# Patient Record
Sex: FEMALE | Race: WHITE
[De-identification: names, ages, dates, MRNs, and addresses within clinical notes are randomized per-mention and may not be internally consistent; named-entity substitution may affect disease eponyms.]

---

## 2020-10-21 NOTE — NUR
MS/RN OPENING NOTE 



Patient awake in bed, A/O x3, bedrest. Breathing even, clear, unlabored on room air. No JVD. 
Tongue midline, no tracheal deviation. CRP <3seconds. Skin is warm, pink, dry, appropriate 
for ethnicity. Discoloration noted on bilateral upper and lower extremities. Edema noted in 
bilateral upper extremities. IV site right wrist 22g saline locked, patent and intact. No 
redness or infiltration. Abdomen large, round, soft, non-tender. BS hypoactive. Patient void 
via bedpan. Urine output, clear, yellow, without difficulty. Bed in low position, wheels 
locked, side rails up x2, call light within reach.

## 2020-10-21 NOTE — NUR
BIBRA39 FRM [facility]. L ANKLE PAIN S/G GLF GOING TO BATHROOM. 100MCG FENTANYL  
GIVEN PTA. ; PT ALERT/AWAKE, PT TO BED 1, -SOB/NAD. PLACED ON MONITOR. RR EVEN 
UNLABORED, NOTED WITH AIR SPLINT ON LLE, VSS. PENDING ER PROVIDER NADYA

## 2020-10-21 NOTE — NUR
RN NOTES



PATIENT RESTING IN BED COMFORTABLY IN MODERATE HIGH BACK REST. A/O X3. NO SIGNS OF DISTRESS 
NOTED. IV ACCESS ON RIGHT WRIST #22, SL. SAFETY MEASURES IN PLACE, BED IN LOWEST LOCKED 
POSITION WITH SIDE RAILS UP X2. CALL LIGHT WITHIN EASY REACH. WILL ENDORSE TO NIGHT SHIFT 
NURSE FOR FAWN.

## 2020-10-21 NOTE — NUR
RN NOTES



REPORT RECEIVED FROM DEBORAH(ALEX RN) AT BED SIDE, ADMITTED 81 Y/O FEMALE, A/O X4, ABLE TO MAKE 
NEEDS KNOWN. PATIENT ORIENTED TO ROOM, UNIT AND STAFF. ON ROOM AIR SATING @97%. V/S STABLE 
AND RECORDED. REFUSED SKIN ASSESSMENT AND REFUSED TO BE MOVED DUE TO PAIN. WILL ADMINISTER 
PAIN MEDICATION.  IV ACCESS ON RIGHT WRIST #22, PATENT AND INTACT. CALLED LAB AND THEY SAID 
SPECIMEN FROM COVID TEST(PCR) IS ALREADY THERE. SAFETY MEASURES INITIATED, BED PLACE IN 
LOWEST LOCKED POSITION WITH SIDE RAILS UP X2. CALL LIGHT PLACED WITHIN REACH. WILL CONTINUE 
TO MONITOR.

## 2020-10-22 NOTE — NUR
MS/RN CLOSING NOTE 



Patient awake in bed, A/O x3, bedrest. Breathing even, clear, unlabored on room air. 
Discoloration noted on bilateral upper and lower extremities. Edema noted in bilateral upper 
extremities. IV site right wrist 22g saline locked, patent and intact. No redness or 
infiltration. Abdomen large, round, soft, non-tender. Patient void via bedpan 1x. Urine 
output, clear, yellow, without difficulty. All scheduled medications administered as 
ordered. Bed in low position, wheels locked, side rails up x2, call light within reach.

## 2020-10-22 NOTE — NUR
MS/RN NOTE 



Patient potassium level 3.4. MD notified. Received new med order for potassium 20 mEq PO 
once. Will continue to monitor.

## 2020-10-22 NOTE — NUR
MS RN NOTES

RECEIVED ON BED A/O X3,ON HIGH FOWLERS POSITION,BREATHING EASY,NO SOB,BARNES IN PLACE 
DRAINING YELLOWISH OUTPUT.IVF NS 125ML/HR RATE IN PROGRESS VIA IV PUMP,SITE PATENT ON RIGHT 
ARM.C/O PAIN ON LEFT ANKLE,S/P FALL AND SUSTAINED FRACTURE ON LEFT ANKLE,AWAITING ORTHO 
CONSULT.ISOLATION PRECAUTION, AWAITING COVID TEST RESULT DONE 10/21.WILL CONTINUE TO  
MONITOR STATUS.

## 2020-10-22 NOTE — NUR
ms rn

received on bed, awake,alert,oriented x3, s/p fall w/ fractured left tibia,dressing intact 
w/ immobilizer on, denies pain at this time, will monitor patient.

## 2020-10-22 NOTE — NUR
ms rn

on bed,no distress noted, still no ortho came to evaluate patient, dr. banda aware and 
texted, awaiting for reply.

## 2020-10-23 NOTE — NUR
BLOOD VERIFIED BY TWO NURSES. TRANSFUSION STARTED AT THIS TIME. PRE TRANSFUSION VS, BP 
140/70, HR 75, RR 18, T 98.0, SPO2 93%. PT EDUCATION PROVIDED TO NOTIFY NURSE OF ANY 
TRANSFUSION REACTIONS, CHILLS, BACK PAIN, ITCHES, FEVER. PT VERBALIZED UNDERSTANDING. WILL 
CONTINUE TO MONITOR

## 2020-10-23 NOTE — NUR
PT REASSESSED, BLOOD TRANSFUSION ONGOING. VS  /66, HR 78, RR 18, T 98.8, SPO2 97%. NO 
S/S OF ANY TRANSFUSION REACTIONS AT THIS TIME. WILL CONTINUE TO MONITOR

## 2020-10-23 NOTE — NUR
MS RN CLOSING NOTES



PT AWAKE IN BED AT THIS.  PT REMAINED STABLE THROUGHOUT SHIFT. PT KEPT CLEAN AND DRY. ALL 
CARE, NEEDS, MEDICATIONS AND TREATMENT ADMINISTERED AS ANTICIPATED PER PROTOCOL.  BARNES CARE 
PROVIDED. PAIN MANAGEMENT ADMINISTERED PER ORDER. SAFETY PRECAUTION IN PLACE AND MAINTAINED 
AT ALL TIMES. BED IN LOWEST LOCKED POSITION, HOB ELEVATED, SIDE RAILS UP X 2, CALL LIGHT 
WITHIN REACH. WILL ENDORSE TO NIGHT SHIFT NURSE FOR FAWN

## 2020-10-23 NOTE — NUR
MS/RN OPENING NOTES:



RECEIVED PT. AWAKE IN BED AT THIS. A/OX3-4. NO SOB NOTED. BREATHING EVEN AND UNLABORED. ON 
RA SATURATING WELL. NO C/O PAIN AT THIS TIME. BARNES CARE IN PLACED, NOTED AND DRAINING CLEAR 
AND YELLOW URINE OUTPUT. IV ON THE RAC #20G, MEAGHAN MIDLINE #18G, INTACT AND PATENT, WITH NS 
RUNNING AT 75MLS/HR. SAFETY PRECAUTION IN PLACE AND MAINTAINED AT ALL TIMES. BED IN LOWEST 
LOCKED POSITION, HOB ELEVATED, SIDE RAILS UP X 2, CALL LIGHT WITHIN REACH. WILL CONTINUE TO 
MONITOR ACCORDINGLY.

## 2020-10-23 NOTE — NUR
MS RN OPENING NOTES



RECEIVED PT AWAKE IN BED AT THIS TIME. PT IS A/O X 3-4. NO SOB NOTED, NO S/S OF ANY ACUTE 
DISTRESS NOTED.  NO C/O PAIN AT THIS TIME. PT ABLE TO VERBALIZE NEEDS. PT NOTED WITH BARNES 
CATHETER DRAINING TO GRAVITY CLEAR YELLOW URINE OUTPUT. RESPIRATIONS ARE EVEN AND UNLABORED 
WITH EQUAL RISE AND FALL IN CHEST. IV ACCESS NOTED IN LFA G#24, INTACT, PATENT AND FLUSHING 
WELL. SAFETY PRECAUTION IN PLACE AND MAINTAINED AT ALL TIMES. BED IN LOWEST LOCKED POSITION, 
HOB ELEVATED, SIDE RAILS UP X 2, CALL LIGHT WITHIN REACH. WILL CONTINUE TO MONITOR

## 2020-10-23 NOTE — NUR
PT REASSESSED, BLOOD TRANSFUSION ONGOING. VS  /80, HR 76, RR 18, T 98.3, SPO2 94%. NO 
S/S OF ANY TRANSFUSION REACTIONS AT THIS TIME. WILL CONTINUE TO MONITOR

## 2020-10-23 NOTE — NUR
CONSENT FOR BLOOD TRANSFUSION SIGNED BY PATIENT AND FILED IN CHART. WILL CONTINUE WITH PLAN 
OF CARE.

## 2020-10-23 NOTE — NUR
BLOOD TRANSFUSION COMPLETED AT THIS TIME. NO S/S OF ANY TRANSFUSION REACTION NOTED. POST 
TRANSFUSION VITAL SIGNS, /82, HR 84, RR 18, T 98.0, SPO2 95%.WILL CONTINUE TO MONITOR

## 2020-10-23 NOTE — NUR
INSERTED IV IN RAC G#20, PT TOLERATED WELL. GOOD BLOOD RETURN NOTED. IV ACCESS, INTACT, 
PATENT AND FLUSHING WELL. WILL CONTINUE TO MONITOR

## 2020-10-23 NOTE — NUR
PT C/O LEFT ANKLE ACHING PAIN OF 9/10. VS WNL. PER PT REQUEST MORPHINE 2MG IV Q4H PRN FOR 
BREAKTHROUGH PAIN ADMINISTERED PER ORDER. WILL CONTINUE TO MONITOR

## 2020-10-23 NOTE — NUR
PT C/O LEFT LEG ACHING PAIN OF 10/10. PT NOTED WITH FACIAL GRIMACE. VS WNL. PER PT REQUEST 
MORPHINE 2MG IV Q4H PRN FOR BREAKTHROUGH PAIN ADMINISTERED PER ORDER AT THIS TIME. WILL 
CONTINUE TO MONITOR

## 2020-10-23 NOTE — NUR
PT NOTED WITH RIGHT WRIST INFILTRATION. IV ACCESS REMOVED, SECURE WITH GAUZE AND TAPE. NO 
SIGN OF BLEEDING NOTED. ICE PACK APPLIED AND HAND RAISED ON PILLOW. WILL CONTINUE TO MONITOR

## 2020-10-23 NOTE — NUR
CRITICAL LAB VALUES FOR HGB 6.2 AND HEMATOCRIT 19, REPORTED BY MANUELA . REPORT READ 
BACK. DR ISAACS MADE AWARE. ORDERS RECEIVED TO TRANSFUSE PRBC PRN. WILL CONTINUE WITH 
PLAN OF CARE AND CONTINUE TO MONITOR

## 2020-10-23 NOTE — NUR
PT REASSESSED, BLOOD TRANSFUSION ONGOING. VS  /68, HR 74, RR 18, T 97.8, SPO2 96%. NO 
S/S OF ANY TRANSFUSION REACTIONS AT THIS TIME. WILL CONTINUE TO MONITOR

## 2020-10-23 NOTE — NUR
MS RN NOTES

IV SITE INFILTRATED,HARD STICK,WILL ENDORSE TO DAY NURSE TO GET AN ORDER FOR  MIDLINE 
ACCESS.STILL PENDING RESULT COVID TEST.

## 2020-10-24 NOTE — NUR
Call made to [facility]. Per  dr. Lew on call ; left information regarding 
patient. Awaiting for call back

## 2020-10-24 NOTE — NUR
RN NOTE



PT RECEIVED IN BED A/A/O X3. PT IS ON RA SATING 95%. PT HAS UNLABORED BREATHING. SAFETY 
MEASURES IN PLACE. CALL LIGHT IN REACH, BED AT LOWEST POSITION, LOCKED,SIDE RAILS UP X2, HOB 
ELEVATED.

## 2020-10-24 NOTE — NUR
patient resting in bed. Patient is comfortable at this time. Pain medication provided. 
patient does not want to be repositioned and refused linen change. Iv fluid is running as 
ordered . All needs attended. Left leg immobilizer in place. F/C in place and draining 
yellow urine. Will endorse to next shift for FAWN

## 2020-10-24 NOTE — NUR
MS/RN CLOSING NOTES:



RECEIVED PT. AWAKE IN BED AT THIS. A/OX3-4. NO SOB NOTED. BREATHING EVEN AND UNLABORED. ON 
RA SATURATING WELL. NO C/O PAIN AT THIS TIME. BARNES CARE IN PLACED, NOTED AND DRAINING CLEAR 
AND YELLOW URINE OUTPUT. TOTAL OUTPUT OF 500ML. IV ON THE RAC #20G, MEAGHAN MIDLINE #18G, INTACT 
AND PATENT, WITH NS RUNNING AT 75MLS/HR. SAFETY PRECAUTION IN PLACE AND MAINTAINED AT ALL 
TIMES. BED IN LOWEST LOCKED POSITION, HOB ELEVATED, SIDE RAILS UP X 2, CALL LIGHT WITHIN 
REACH. ALL DUE MEDS GIVEN AS ORDERED. ALL NURSING NEEDS MET AND RENDERED. WILL ENDORSE TO 
DAY SHIFT RN FOR FAWN.

## 2020-10-24 NOTE — NUR
rn note



pt urine culture came back, dr garza ordered bactrim ds tab po bid for 5 days. order carried 
out.

## 2020-10-25 NOTE — NUR
MS RN NOTES

PATIENT IN BED ALERT ORIENTED X 3. NO ACUTE DISTRESS NOTED. BREATHING UNLABORED. NO SOB 
NOTED. IV ACCESS PATENT AND INTACT, NO REDNESS, NO SWELLING NOTED. NEEDS ATTENDED AND 
ANTICIPATED. KEPT CLEAN DRY AND COMFORTABLE. SAFETY MEASURES IN PLACE. CALL LIGHT WITHIN 
REACH. WILL ENDORSE TO NIGHT NURSE FOR CONTINUITY OF CARE.

## 2020-10-25 NOTE — NUR
MS RN NOTE

PT IN BED AWAKE. A/O X 3. NO SOB NO DISTRESS OR DISCOMFORT NOTED. DENIES PAIN. F/C INTACT 
AND PATENT DRAINING YELLOWISH COLOR URINE. IV SITE INTACT AND PATENT. NO S/S OF INFILTRATION 
NOTED. REPOSITION HIM Q2H, KEPT HER DRY AND CLEAN. ALL NEEDS ATTENDED. VSS. CONTINUE TO 
MONITOR.

## 2020-10-26 NOTE — NUR
rn notes



patient remains on room air, no sob noted, f/c in place.  MEAGHAN midline saline lock.  blood 
being transfused at this time.  No sob noted.  no side effect noted.  bed at the lowest 
setting, call light within reach, side rails up x2.

## 2020-10-26 NOTE — NUR
MS RN CRITICAL RESULTS NOTES



LAB CALLED WITH A CRITICAL RESULT FOR HGB AT 6.7

WAITING FOR DR GUSMAN TO REPORT.

## 2020-10-26 NOTE — NUR
TELE RN OPENING NOTES



RECEIVED PATIENT IN BED, ASLEEP BUT AWAKEN EASILY. PATIENT ON OXYGEN THERAPY AT THIS TIME AT 
2LPM VIA NASAL CANULA; BREATHING EVEN AND UNLABORED; NO SOB NOTICED AT THIS TIME. PAIN 
TREATED WITH PRN PAIN MEDICATION BY NIGHT NURSE. MEAGHAN MIDLINE PRESENT AND INTACT. SAFETY 
PRECAUTIONS IN PLACE; BED IN LOW POSITION AND LOCKED, RAILS UP X2, CALL LIGHT WITHIN REACH. 
WILL CONTINUE TO MONITOR PATIENT.

## 2020-10-26 NOTE — NUR
MS RN NOTE

PT IN BED ASLEEP, AROUSABLE. NO DISTRESS OR DISCOMFORT NOTED. PAIN MEDS GIVEN ROUTINELY AND 
PRN. ALL NEEDS ATTENDED. IV SITE INTACT AND PATENT. NO S/S OF INFILTRATION NOTED. F/C INTACT 
AND PATENT DRAINING CLOUDY URINE. REPOSITION HER FOR COMFORT Q2H, KEPT HER DRY AND CLEAN. 
SIDE RAILS UP X 2 AND CALL LIGHT WITHIN REACH. WILL ENDORSE TO DAY SHIFT NURSE FOR CONTINUE 
TO CARE.

## 2020-10-27 NOTE — NUR
PT FERRLECIT Q24HR SCHEDULED AT 1400. INFORMED [facility] TECH OF FERRLECIT LASTLY 
GIVEN  16HRS AGO. PER CARINA JOHN TECH,  ADMINISTER FERRLECIT. ORDERS, CARRIED OUT. WILL 
CONTINUE TO MONITOR

## 2020-10-27 NOTE — NUR
AUGUSTUS, PT'S CAREGIVER () INFORMED OF PT'S TRANSFER TO [facility]. WILL CONTINUE 
WITH PLAN OF CARE

## 2020-10-27 NOTE — NUR
MS RN DISCHARGE NOTES



PT DISCHARGE TO [facility] AT THIS TIME. PT IN MEDICALLY STABLE CONDITION. REPORT GIVE TO 
MILAN FAROOQ AT [facility]. PT GOING TO ROOM 7102. ALL CARE NEEDS, TREAT AND MEDICATIONS 
ADMINISTERED AS ANTICIPATED PER ORDER. PT KEPT CLEAN AND DRY. ALL DISCHARGE INSTRUCTIONS 
PROVIDED FOR AND PT VERBALIZED UNDERSTANDING. PT HAD NO BELONGINGS.  PT TRANSPORTED BY 
AMBULANCE WITH ACLS PROTOCOL IN PLACE. NANCY CHARGE NURSE MADE AWARE

## 2020-10-27 NOTE — NUR
ADMINISTERED FLU VACCINE IN LEFT ARM AND PNEUMOCOCCAL 23 RIGHT ARM AT THIS TIME. WILL 
CONTINUE TO MONITOR

-------------------------------------------------------------------------------

Addendum: 10/27/20 at 1931 by TY BORDEN RN

-------------------------------------------------------------------------------

ADMINISTERED FLU VACCINE IN LEFT ARM AND PNEUMOVAX 23 RIGHT ARM AT THIS TIME. WILL CONTINUE 
TO MONITOR

## 2020-10-27 NOTE — NUR
CHARLOTTE GARRIDO REQUEST A COVID RAPID SWAB FOR PLACEMENT. COVID RAPID SWAB DONE AT THIS TIME. WILL 
CONTINUE WITH PLAN OF CARE

## 2020-10-27 NOTE — NUR
TELEPHONE CALL PLACED TO [facility], FOR UPDATES ON PT'S PNEUMOCOCCAL AND FLU 
VACCINE. SPOKE WITH POOL, MEDICAL RECORDS STAFF AT [facility]. PER POOL, PT HAS NOT 
RECEIVED PNEUMOCOCCAL AND FLU VACCINE. WILL ADMINISTER VACCINES AND CONTINUE WITH PLAN OF 
CARE

## 2020-10-27 NOTE — NUR
MS RN OPENING NOTES



RECEIVED PT AWAKE IN BED AT THIS TIME. AOX3. PT ABLE TO VERBALIZE NEEDS. NO SOB NOTED, NO 
S/S OF ANY ACUTE DISTRESS NOTED.  NO C/O PAIN AT THIS TIME. RESPIRATIONS ARE EVEN AND 
UNLABORED WITH EQUAL RISE AND FALL IN CHEST. MEAGHAN MIDLINE NOTED, INTACT, PATENT AND FLUSHING 
WELL. ASPIRATION AND SAFETY PRECAUTION IN PLACE AND MAINTAINED AT ALL TIMES. BED IN LOWEST 
LOCKED POSITION, HOB ELEVATED, SIDE RAILS UP X 2, CALL LIGHT WITHIN REACH. WILL CONTINUE TO 
MONITOR


-------------------------------------------------------------------------------

Addendum: 10/27/20 at 0810 by TY BORDEN RN

-------------------------------------------------------------------------------

MS RN OPENING NOTES



RECEIVED PT AWAKE IN BED AT THIS TIME. AOX3. PT ABLE TO VERBALIZE NEEDS. NO SOB NOTED, NO 
S/S OF ANY ACUTE DISTRESS NOTED.  NO C/O PAIN AT THIS TIME. RESPIRATIONS ARE EVEN AND 
UNLABORED WITH EQUAL RISE AND FALL IN CHEST. MEAGHAN MIDLINE NOTED, INTACT, PATENT AND FLUSHING 
WELL. PT NOTED WITH FC IN PLACE, DRAINING TO GRAVITY CLEAR YELLOW URINE OUTPUT. ASPIRATION 
AND SAFETY PRECAUTION IN PLACE AND MAINTAINED AT ALL TIMES. BED IN LOWEST LOCKED POSITION, 
HOB ELEVATED, SIDE RAILS UP X 2, CALL LIGHT WITHIN REACH. WILL CONTINUE TO MONITOR

## 2020-10-27 NOTE — NUR
PT C/O LEFT ANKLE ACHING PAIN OF 10/10. VS WNL. PT NOTED WITH FACIAL GRIMACE AND MOANING. 
PER PT REQUEST MORPHINE 2MG IV Q4H PRN FOR BREAKTHROUGH PAIN ADMINISTERED PER ORDER. WILL 
CONTINUE TO MONITOR

## 2022-04-30 ENCOUNTER — HOSPITAL ENCOUNTER (INPATIENT)
Dept: HOSPITAL 54 - ER | Age: 82
LOS: 2 days | DRG: 177 | End: 2022-05-02
Attending: INTERNAL MEDICINE | Admitting: INTERNAL MEDICINE
Payer: MEDICARE

## 2022-04-30 VITALS — DIASTOLIC BLOOD PRESSURE: 33 MMHG | SYSTOLIC BLOOD PRESSURE: 85 MMHG

## 2022-04-30 VITALS — DIASTOLIC BLOOD PRESSURE: 34 MMHG | SYSTOLIC BLOOD PRESSURE: 92 MMHG

## 2022-04-30 VITALS — DIASTOLIC BLOOD PRESSURE: 36 MMHG | SYSTOLIC BLOOD PRESSURE: 123 MMHG

## 2022-04-30 VITALS — SYSTOLIC BLOOD PRESSURE: 86 MMHG | DIASTOLIC BLOOD PRESSURE: 38 MMHG

## 2022-04-30 VITALS — SYSTOLIC BLOOD PRESSURE: 92 MMHG | DIASTOLIC BLOOD PRESSURE: 59 MMHG

## 2022-04-30 VITALS — SYSTOLIC BLOOD PRESSURE: 96 MMHG | DIASTOLIC BLOOD PRESSURE: 37 MMHG

## 2022-04-30 VITALS — DIASTOLIC BLOOD PRESSURE: 55 MMHG | SYSTOLIC BLOOD PRESSURE: 114 MMHG

## 2022-04-30 VITALS — SYSTOLIC BLOOD PRESSURE: 119 MMHG | DIASTOLIC BLOOD PRESSURE: 39 MMHG

## 2022-04-30 VITALS — DIASTOLIC BLOOD PRESSURE: 107 MMHG | SYSTOLIC BLOOD PRESSURE: 133 MMHG

## 2022-04-30 VITALS — SYSTOLIC BLOOD PRESSURE: 95 MMHG | DIASTOLIC BLOOD PRESSURE: 37 MMHG

## 2022-04-30 VITALS — SYSTOLIC BLOOD PRESSURE: 112 MMHG | DIASTOLIC BLOOD PRESSURE: 46 MMHG

## 2022-04-30 VITALS — SYSTOLIC BLOOD PRESSURE: 108 MMHG | DIASTOLIC BLOOD PRESSURE: 49 MMHG

## 2022-04-30 VITALS — SYSTOLIC BLOOD PRESSURE: 90 MMHG | DIASTOLIC BLOOD PRESSURE: 47 MMHG

## 2022-04-30 VITALS — DIASTOLIC BLOOD PRESSURE: 65 MMHG | SYSTOLIC BLOOD PRESSURE: 116 MMHG

## 2022-04-30 VITALS — SYSTOLIC BLOOD PRESSURE: 112 MMHG | DIASTOLIC BLOOD PRESSURE: 58 MMHG

## 2022-04-30 VITALS — WEIGHT: 205 LBS | HEIGHT: 69 IN | BODY MASS INDEX: 30.36 KG/M2

## 2022-04-30 VITALS — SYSTOLIC BLOOD PRESSURE: 107 MMHG | DIASTOLIC BLOOD PRESSURE: 63 MMHG

## 2022-04-30 VITALS — SYSTOLIC BLOOD PRESSURE: 97 MMHG | DIASTOLIC BLOOD PRESSURE: 34 MMHG

## 2022-04-30 VITALS — DIASTOLIC BLOOD PRESSURE: 33 MMHG | SYSTOLIC BLOOD PRESSURE: 123 MMHG

## 2022-04-30 VITALS — SYSTOLIC BLOOD PRESSURE: 101 MMHG | DIASTOLIC BLOOD PRESSURE: 46 MMHG

## 2022-04-30 VITALS — SYSTOLIC BLOOD PRESSURE: 97 MMHG | DIASTOLIC BLOOD PRESSURE: 37 MMHG

## 2022-04-30 VITALS — DIASTOLIC BLOOD PRESSURE: 41 MMHG | SYSTOLIC BLOOD PRESSURE: 119 MMHG

## 2022-04-30 VITALS — DIASTOLIC BLOOD PRESSURE: 37 MMHG | SYSTOLIC BLOOD PRESSURE: 105 MMHG

## 2022-04-30 VITALS — SYSTOLIC BLOOD PRESSURE: 94 MMHG | DIASTOLIC BLOOD PRESSURE: 36 MMHG

## 2022-04-30 VITALS — DIASTOLIC BLOOD PRESSURE: 28 MMHG | SYSTOLIC BLOOD PRESSURE: 93 MMHG

## 2022-04-30 VITALS — DIASTOLIC BLOOD PRESSURE: 54 MMHG | SYSTOLIC BLOOD PRESSURE: 112 MMHG

## 2022-04-30 VITALS — SYSTOLIC BLOOD PRESSURE: 153 MMHG | DIASTOLIC BLOOD PRESSURE: 70 MMHG

## 2022-04-30 VITALS — DIASTOLIC BLOOD PRESSURE: 39 MMHG | SYSTOLIC BLOOD PRESSURE: 92 MMHG

## 2022-04-30 VITALS — SYSTOLIC BLOOD PRESSURE: 91 MMHG | DIASTOLIC BLOOD PRESSURE: 49 MMHG

## 2022-04-30 DIAGNOSIS — Z51.5: ICD-10-CM

## 2022-04-30 DIAGNOSIS — E87.5: ICD-10-CM

## 2022-04-30 DIAGNOSIS — E11.22: ICD-10-CM

## 2022-04-30 DIAGNOSIS — E88.09: ICD-10-CM

## 2022-04-30 DIAGNOSIS — F32.A: ICD-10-CM

## 2022-04-30 DIAGNOSIS — I13.2: ICD-10-CM

## 2022-04-30 DIAGNOSIS — Z79.891: ICD-10-CM

## 2022-04-30 DIAGNOSIS — Z88.0: ICD-10-CM

## 2022-04-30 DIAGNOSIS — E66.9: ICD-10-CM

## 2022-04-30 DIAGNOSIS — Z96.643: ICD-10-CM

## 2022-04-30 DIAGNOSIS — J96.22: ICD-10-CM

## 2022-04-30 DIAGNOSIS — Z79.51: ICD-10-CM

## 2022-04-30 DIAGNOSIS — J15.6: Primary | ICD-10-CM

## 2022-04-30 DIAGNOSIS — G93.41: ICD-10-CM

## 2022-04-30 DIAGNOSIS — Z99.2: ICD-10-CM

## 2022-04-30 DIAGNOSIS — F03.90: ICD-10-CM

## 2022-04-30 DIAGNOSIS — Z91.15: ICD-10-CM

## 2022-04-30 DIAGNOSIS — J96.21: ICD-10-CM

## 2022-04-30 DIAGNOSIS — Z66: ICD-10-CM

## 2022-04-30 DIAGNOSIS — Z20.822: ICD-10-CM

## 2022-04-30 DIAGNOSIS — Z79.899: ICD-10-CM

## 2022-04-30 DIAGNOSIS — D69.6: ICD-10-CM

## 2022-04-30 DIAGNOSIS — Z96.653: ICD-10-CM

## 2022-04-30 DIAGNOSIS — E46: ICD-10-CM

## 2022-04-30 DIAGNOSIS — Z88.8: ICD-10-CM

## 2022-04-30 DIAGNOSIS — E87.2: ICD-10-CM

## 2022-04-30 DIAGNOSIS — Z79.01: ICD-10-CM

## 2022-04-30 DIAGNOSIS — E66.01: ICD-10-CM

## 2022-04-30 DIAGNOSIS — F41.9: ICD-10-CM

## 2022-04-30 DIAGNOSIS — G89.29: ICD-10-CM

## 2022-04-30 DIAGNOSIS — D64.9: ICD-10-CM

## 2022-04-30 DIAGNOSIS — N18.6: ICD-10-CM

## 2022-04-30 DIAGNOSIS — I50.9: ICD-10-CM

## 2022-04-30 LAB
ALBUMIN SERPL BCP-MCNC: 3.1 G/DL (ref 3.4–5)
ALP SERPL-CCNC: 120 U/L (ref 46–116)
ALT SERPL W P-5'-P-CCNC: 45 U/L (ref 12–78)
AST SERPL W P-5'-P-CCNC: 19 U/L (ref 15–37)
BASE EXCESS BLDA CALC-SCNC: -16.3 MMOL/L
BASOPHILS # BLD AUTO: 0 K/UL (ref 0–0.2)
BASOPHILS NFR BLD AUTO: 0.2 % (ref 0–2)
BILIRUB DIRECT SERPL-MCNC: 0.1 MG/DL (ref 0–0.2)
BILIRUB SERPL-MCNC: 0.3 MG/DL (ref 0.2–1)
BILIRUB UR QL STRIP: NEGATIVE
BUN SERPL-MCNC: 80 MG/DL (ref 7–18)
CALCIUM SERPL-MCNC: 9.9 MG/DL (ref 8.5–10.1)
CHLORIDE SERPL-SCNC: 103 MMOL/L (ref 98–107)
CO2 SERPL-SCNC: 21 MMOL/L (ref 21–32)
COLOR UR: YELLOW
CREAT SERPL-MCNC: 4.1 MG/DL (ref 0.6–1.3)
DEPRECATED SQUAMOUS URNS QL MICRO: (no result) /HPF
DO-HGB MFR BLDA: 570.1 MMHG
EOSINOPHIL NFR BLD AUTO: 0.1 % (ref 0–6)
GLUCOSE SERPL-MCNC: 145 MG/DL (ref 74–106)
GLUCOSE UR STRIP-MCNC: NEGATIVE MG/DL
HCT VFR BLD AUTO: 28 % (ref 33–45)
HGB BLD-MCNC: 8.9 G/DL (ref 11.5–14.8)
INHALED O2 CONCENTRATION: 100 %
LEUKOCYTE ESTERASE UR QL STRIP: NEGATIVE
LYMPHOCYTES NFR BLD AUTO: 0.5 K/UL (ref 0.8–4.8)
LYMPHOCYTES NFR BLD AUTO: 5.4 % (ref 20–44)
LYMPHOCYTES NFR BLD MANUAL: 7 % (ref 16–48)
MCHC RBC AUTO-ENTMCNC: 32 G/DL (ref 31–36)
MCV RBC AUTO: 105 FL (ref 82–100)
MONOCYTES NFR BLD AUTO: 0.5 K/UL (ref 0.1–1.3)
MONOCYTES NFR BLD AUTO: 5.3 % (ref 2–12)
MONOCYTES NFR BLD MANUAL: 3 % (ref 0–11)
NEUTROPHILS # BLD AUTO: 7.7 K/UL (ref 1.8–8.9)
NEUTROPHILS NFR BLD AUTO: 89 % (ref 43–81)
NEUTS SEG NFR BLD MANUAL: 90 % (ref 42–76)
NITRITE UR QL STRIP: NEGATIVE
PCO2 TEMP ADJ BLDA: 72.7 MMHG (ref 35–45)
PH TEMP ADJ BLDA: 6.94 [PH] (ref 7.35–7.45)
PH UR STRIP: 5 [PH] (ref 5–8)
PLATELET # BLD AUTO: 186 K/UL (ref 150–450)
PO2 TEMP ADJ BLDA: 70.2 MMHG (ref 75–100)
POTASSIUM SERPL-SCNC: 5.2 MMOL/L (ref 3.5–5.1)
PROT SERPL-MCNC: 7 G/DL (ref 6.4–8.2)
PROT UR QL STRIP: 100 MG/DL
RBC # BLD AUTO: 2.64 MIL/UL (ref 4–5.2)
RBC #/AREA URNS HPF: (no result) /HPF (ref 0–2)
SAO2 % BLDA: 90.4 % (ref 92–98.5)
SODIUM SERPL-SCNC: 133 MMOL/L (ref 136–145)
UROBILINOGEN UR STRIP-MCNC: 0.2 EU/DL
VENTILATION MODE VENT: (no result)
WBC #/AREA URNS HPF: (no result) /HPF (ref 0–3)
WBC NRBC COR # BLD AUTO: 8.7 K/UL (ref 4.3–11)

## 2022-04-30 PROCEDURE — G0378 HOSPITAL OBSERVATION PER HR: HCPCS

## 2022-04-30 PROCEDURE — C9803 HOPD COVID-19 SPEC COLLECT: HCPCS

## 2022-04-30 PROCEDURE — 5A09457 ASSISTANCE WITH RESPIRATORY VENTILATION, 24-96 CONSECUTIVE HOURS, CONTINUOUS POSITIVE AIRWAY PRESSURE: ICD-10-PCS | Performed by: INTERNAL MEDICINE

## 2022-04-30 PROCEDURE — 5A1D70Z PERFORMANCE OF URINARY FILTRATION, INTERMITTENT, LESS THAN 6 HOURS PER DAY: ICD-10-PCS

## 2022-04-30 RX ADMIN — Medication SCH MG: at 09:00

## 2022-04-30 RX ADMIN — CARVEDILOL SCH MG: 12.5 TABLET, FILM COATED ORAL at 09:00

## 2022-04-30 RX ADMIN — SERTRALINE HYDROCHLORIDE SCH MG: 50 TABLET, FILM COATED ORAL at 09:00

## 2022-04-30 RX ADMIN — CARVEDILOL SCH MG: 12.5 TABLET, FILM COATED ORAL at 16:34

## 2022-04-30 RX ADMIN — Medication SCH MG: at 16:34

## 2022-04-30 NOTE — NUR
TELE RN OPENING NOTES



RECEIVED PT IN BED ASLEEP, EASILY AWAKEN UPON CALL. A/O X1. ON NON-REBREATHER MASK @ 15L, 
TOLERATING WELL WITH SPO2 96%. BREATHING UNLABORED WITH NO SIGN OF RESPIRATORY DISTRESS. ON 
TELE MONITOR WITH CURRENT READING OF SINUS GLENROY, HR 52. NO COMPLAIN OF CARDIAC DISCOMFORT 
OR DISTRESS. IV ACCESS IN RAC G #18, INTACT AND PATENT. SOFT WRIST RESTRAINTS IN PLACE, NO 
SKIN AND CIRCULATION PROBLEM NOTED WHEN CHECKED. SAFETY MEASURES IN PLACE: BED IN LOWEST AND 
LOCKED POSITION, BED RAILS UP X3, BED ALARM ON, CALL LIGHT WITHIN REACH. WILL CONTINUE TO 
MONITOR PT AND WITH PLAN OF CARE.

## 2022-04-30 NOTE — NUR
RN NOTES 

SPOKEN TO PT'S BROTHER ON THE PHONE, RACHEL DANG .  HE REQUESTING COMFORT CARE AND STATED 
WANTS TO SPEAK WITH PCP . DR TOUSSAINT NOTIFIED .

## 2022-04-30 NOTE — NUR
RN NOTES



CALLED DR. FERNÁNDEZ AND RELAYED THE ABG's RESULTS WITH ORDERS TO TRANSFER PT TO ICU. DR. FERNÁNDEZ 
IS AWARE PT IS DNR/DNI.

## 2022-04-30 NOTE — NUR
RN NOTES 

TWO BLOOD GLUCOSE LEVEL NOTED ON   , THAT WAS NOT DONE ON THE PT AND SHOWING ON 
THE PT'S LAB RESULTS. ACCU CHECK WAS DONE BY GARY YATES ON [address] , GARY YATES  NOTIFIED .

## 2022-04-30 NOTE — NUR
RN NOTES



PT TELE MONITOR CURRENT READING IS SINUS BRADYCARDIA, HR 47. RECEIVE AN ORDER FROM DR. FERNÁNDEZ 
TO CHECK ABGs. PT REMAINS ON NON REBREATHER MASK AT 15L WITH SPO2 AT 95%

## 2022-04-30 NOTE — NUR
RN NOTES 

RECEIVED PT IN ROOM 257 ICU STATUS, DNI/DNR , OBESE,  PT IS LETHARGIC , RESPONDS TO PAINFUL 
STIMULI, DOES NOT FOLLOW COMMAND , ON BIPAP , O2 SAT WNL, ON TELE SB HR IN LOW 50'S , T=94.4 
RECTALLY, PT PLACED ON WARM BLANKET ,  IV SITE CLEAN DRY AND INTACT , SR UP x3, CALL LIGHT 
WITHIN EASY REACH, BED LOCKED AND IN LOWEST POSITION , CONTINUE TO MONITOR

## 2022-04-30 NOTE — NUR
-------------------------------------------------------------------------------

          *** Note undone in [facility] - 04/30/22 at 0406 by FLOYD ***           

-------------------------------------------------------------------------------

TELE [id number]

## 2022-04-30 NOTE — NUR
TELE RN NOTES





PT ARRIEVED TO [facility]IA [facility]. PT TRANSFERRED TO BED. PT A/O X1 NOTED WITH CONFUSION PT WAS 
TAKING OFF NON REBREATHER MAST DESPITE REORIENTATION AND REMINDER TO KEEP IT ON AT ALL TIMES 
PT ON 15L NON REBREATHER. NOTED WITH IV ACCESS ON THE RAC 20G PATENT FLUSHED. PT NOTED WITH 
NO SKIN DISCOLORATION OR WOUNDS. PT ORIENTED TO ROOM AND UNIT. CALL LIGTH PLACED WITHIN 
REACH. TABLE WITHIN REACH. PT TO REMAIN NPO AT THIS TIME. PT CONTINUED TO TAKE OFF MASK 
DESPITE CLOSE MONITORING EVERY CHANCE SHE GOT RPEORTED TO ON CALL NP Replaced by [facility] RECEIVED NEW ORDER 
FOR BILATERAL SOFT WRIST RESTRAINTS. NOTED AND CARRIED. OUT WILL CONTINUE TO MONITOR PT 
CLOSELY.

## 2022-04-30 NOTE — NUR
RN NOTES 

PT REMAINS ON BIPAP, RESPONDS TO PAINFUL STIMULI,DOES NOT FOLLOW COMMAND,  NO URINE OUTPUT 
NOTED, SR UP x3, CALL LIGHT WITHIN EASY REACH, WILL ENDORSE TO NIGHT SHIFT NURSE FOR 
CONTINUITY OF CARE .

## 2022-04-30 NOTE — NUR
TRANSFER RN NOTES



PT TRANSFERRED TO ICU. PT WAS AWAKE WITH NON-REBREATHER MASK OXYGEN IN PLACE AT 15L. GAVE 
REPORT TO ICU NURSE ASHLEY.

## 2022-04-30 NOTE — NUR
TELE RN NOTES



SPOKE TO PT MCKENNA Garcia CONFIRMED POLST IS CORRECT VIA TELEPHONE WOULD LIKE TO BE 
INFORMED REGARDING PTS DISPOSITION AS HE LIVES IN [place] HE WILL NEED TO MAKE ARRANGEMENT 
BEFORE HAND FOR PTS REMAINS ONCE SHE  PASSES AWAY.

## 2022-05-01 VITALS — SYSTOLIC BLOOD PRESSURE: 125 MMHG | DIASTOLIC BLOOD PRESSURE: 51 MMHG

## 2022-05-01 VITALS — DIASTOLIC BLOOD PRESSURE: 53 MMHG | SYSTOLIC BLOOD PRESSURE: 121 MMHG

## 2022-05-01 VITALS — DIASTOLIC BLOOD PRESSURE: 64 MMHG | SYSTOLIC BLOOD PRESSURE: 116 MMHG

## 2022-05-01 VITALS — DIASTOLIC BLOOD PRESSURE: 55 MMHG | SYSTOLIC BLOOD PRESSURE: 155 MMHG

## 2022-05-01 VITALS — SYSTOLIC BLOOD PRESSURE: 125 MMHG | DIASTOLIC BLOOD PRESSURE: 50 MMHG

## 2022-05-01 VITALS — DIASTOLIC BLOOD PRESSURE: 56 MMHG | SYSTOLIC BLOOD PRESSURE: 127 MMHG

## 2022-05-01 VITALS — SYSTOLIC BLOOD PRESSURE: 121 MMHG | DIASTOLIC BLOOD PRESSURE: 45 MMHG

## 2022-05-01 VITALS — DIASTOLIC BLOOD PRESSURE: 38 MMHG | SYSTOLIC BLOOD PRESSURE: 103 MMHG

## 2022-05-01 VITALS — SYSTOLIC BLOOD PRESSURE: 120 MMHG | DIASTOLIC BLOOD PRESSURE: 44 MMHG

## 2022-05-01 VITALS — SYSTOLIC BLOOD PRESSURE: 134 MMHG | DIASTOLIC BLOOD PRESSURE: 59 MMHG

## 2022-05-01 VITALS — SYSTOLIC BLOOD PRESSURE: 114 MMHG | DIASTOLIC BLOOD PRESSURE: 90 MMHG

## 2022-05-01 VITALS — DIASTOLIC BLOOD PRESSURE: 53 MMHG | SYSTOLIC BLOOD PRESSURE: 112 MMHG

## 2022-05-01 VITALS — SYSTOLIC BLOOD PRESSURE: 127 MMHG | DIASTOLIC BLOOD PRESSURE: 43 MMHG

## 2022-05-01 VITALS — DIASTOLIC BLOOD PRESSURE: 57 MMHG | SYSTOLIC BLOOD PRESSURE: 130 MMHG

## 2022-05-01 VITALS — SYSTOLIC BLOOD PRESSURE: 127 MMHG | DIASTOLIC BLOOD PRESSURE: 56 MMHG

## 2022-05-01 VITALS — SYSTOLIC BLOOD PRESSURE: 124 MMHG | DIASTOLIC BLOOD PRESSURE: 49 MMHG

## 2022-05-01 VITALS — SYSTOLIC BLOOD PRESSURE: 107 MMHG | DIASTOLIC BLOOD PRESSURE: 56 MMHG

## 2022-05-01 VITALS — DIASTOLIC BLOOD PRESSURE: 49 MMHG | SYSTOLIC BLOOD PRESSURE: 133 MMHG

## 2022-05-01 VITALS — DIASTOLIC BLOOD PRESSURE: 49 MMHG | SYSTOLIC BLOOD PRESSURE: 152 MMHG

## 2022-05-01 VITALS — SYSTOLIC BLOOD PRESSURE: 111 MMHG | DIASTOLIC BLOOD PRESSURE: 63 MMHG

## 2022-05-01 LAB
ALBUMIN SERPL BCP-MCNC: 2.7 G/DL (ref 3.4–5)
ALP SERPL-CCNC: 96 U/L (ref 46–116)
ALT SERPL W P-5'-P-CCNC: 33 U/L (ref 12–78)
AST SERPL W P-5'-P-CCNC: 13 U/L (ref 15–37)
BASOPHILS # BLD AUTO: 0 K/UL (ref 0–0.2)
BASOPHILS NFR BLD AUTO: 0.1 % (ref 0–2)
BILIRUB SERPL-MCNC: 0.3 MG/DL (ref 0.2–1)
BUN SERPL-MCNC: 94 MG/DL (ref 7–18)
CALCIUM SERPL-MCNC: 9.6 MG/DL (ref 8.5–10.1)
CHLORIDE SERPL-SCNC: 106 MMOL/L (ref 98–107)
CO2 SERPL-SCNC: 20 MMOL/L (ref 21–32)
CREAT SERPL-MCNC: 5 MG/DL (ref 0.6–1.3)
EOSINOPHIL NFR BLD AUTO: 0 % (ref 0–6)
GLUCOSE SERPL-MCNC: 76 MG/DL (ref 74–106)
HCT VFR BLD AUTO: 23 % (ref 33–45)
HGB BLD-MCNC: 7.5 G/DL (ref 11.5–14.8)
LYMPHOCYTES NFR BLD AUTO: 0.6 K/UL (ref 0.8–4.8)
LYMPHOCYTES NFR BLD AUTO: 7.4 % (ref 20–44)
MAGNESIUM SERPL-MCNC: 2.7 MG/DL (ref 1.8–2.4)
MCHC RBC AUTO-ENTMCNC: 33 G/DL (ref 31–36)
MCV RBC AUTO: 104 FL (ref 82–100)
MONOCYTES NFR BLD AUTO: 0.5 K/UL (ref 0.1–1.3)
MONOCYTES NFR BLD AUTO: 5.8 % (ref 2–12)
NEUTROPHILS # BLD AUTO: 7.5 K/UL (ref 1.8–8.9)
NEUTROPHILS NFR BLD AUTO: 86.7 % (ref 43–81)
PHOSPHATE SERPL-MCNC: 8.3 MG/DL (ref 2.5–4.9)
PLATELET # BLD AUTO: 172 K/UL (ref 150–450)
POTASSIUM SERPL-SCNC: 5.4 MMOL/L (ref 3.5–5.1)
PROT SERPL-MCNC: 6.2 G/DL (ref 6.4–8.2)
RBC # BLD AUTO: 2.22 MIL/UL (ref 4–5.2)
SODIUM SERPL-SCNC: 140 MMOL/L (ref 136–145)
WBC NRBC COR # BLD AUTO: 8.7 K/UL (ref 4.3–11)

## 2022-05-01 RX ADMIN — Medication SCH MG: at 17:00

## 2022-05-01 RX ADMIN — Medication SCH MG: at 08:32

## 2022-05-01 RX ADMIN — SERTRALINE HYDROCHLORIDE SCH MG: 50 TABLET, FILM COATED ORAL at 08:32

## 2022-05-01 NOTE — NUR
rn notes

 patient on full liquid diet at this time, and hold BIPAP at this tie per family request. 
will follow up.

## 2022-05-01 NOTE — NUR
RN NOTES

 RECEIVED PATIENT IN BIPAP AT THIS TIME, TOLERATING WELL. VSS, HR MONITOR SHOW A-FIB 87. 
PATIENT HAS GENERALIZED  EDEMA, LAB VALUES NOTIFIED  HOSPITALIST, AND  NEPHROLOGIST, HD WAS 
CANCELLED. PATIENT DNR/DNI. ASSIST TURN AND REPOSTION Q 2 HR. WILL FOLLOW UP.

## 2022-05-01 NOTE — NUR
ICU RN

MULTIPLE CALLS WERE PLACED FOR LAST RITES FOR PT; NO RESPONSE. WILL ENDORSE TO NEXT SHIFT TO 
CALL AGAIN.

## 2022-05-01 NOTE — NUR
rn notes 

Transferred patient to the TELE unit room 104 with stable condition.patient on O2-4LNC. no 
acute respiratory distress. patient tolerated dinner 10%, buchanan output was 180 ml total 
shift, assist turn and reposition q 2 hr. endorsed oncoming nurse roby.

## 2022-05-01 NOTE — NUR
rn notes

patient o2-94%, removed bipap via RT, patient on 3lNC, no acute respiratory distress, 
swallow evaluation done, patient passed test, assist eating, due medication administered. 
patient awake, able to answer some questions. iv access on RAC, and LFA intact. will follow 
up.

## 2022-05-02 VITALS — SYSTOLIC BLOOD PRESSURE: 124 MMHG | DIASTOLIC BLOOD PRESSURE: 40 MMHG

## 2022-05-02 VITALS — DIASTOLIC BLOOD PRESSURE: 44 MMHG | SYSTOLIC BLOOD PRESSURE: 133 MMHG

## 2022-05-02 VITALS — SYSTOLIC BLOOD PRESSURE: 106 MMHG | DIASTOLIC BLOOD PRESSURE: 49 MMHG

## 2022-05-02 VITALS — SYSTOLIC BLOOD PRESSURE: 113 MMHG | DIASTOLIC BLOOD PRESSURE: 53 MMHG

## 2022-05-02 LAB
BASOPHILS # BLD AUTO: 0 K/UL (ref 0–0.2)
BASOPHILS NFR BLD AUTO: 0.1 % (ref 0–2)
BUN SERPL-MCNC: 105 MG/DL (ref 7–18)
CALCIUM SERPL-MCNC: 9.7 MG/DL (ref 8.5–10.1)
CHLORIDE SERPL-SCNC: 105 MMOL/L (ref 98–107)
CO2 SERPL-SCNC: 18 MMOL/L (ref 21–32)
CREAT SERPL-MCNC: 5.8 MG/DL (ref 0.6–1.3)
EOSINOPHIL NFR BLD AUTO: 0 % (ref 0–6)
GLUCOSE SERPL-MCNC: 136 MG/DL (ref 74–106)
HCT VFR BLD AUTO: 25 % (ref 33–45)
HGB BLD-MCNC: 8 G/DL (ref 11.5–14.8)
LYMPHOCYTES NFR BLD AUTO: 0.6 K/UL (ref 0.8–4.8)
LYMPHOCYTES NFR BLD AUTO: 6.6 % (ref 20–44)
MCHC RBC AUTO-ENTMCNC: 32 G/DL (ref 31–36)
MCV RBC AUTO: 106 FL (ref 82–100)
MONOCYTES NFR BLD AUTO: 0.5 K/UL (ref 0.1–1.3)
MONOCYTES NFR BLD AUTO: 6.1 % (ref 2–12)
NEUTROPHILS # BLD AUTO: 7.6 K/UL (ref 1.8–8.9)
NEUTROPHILS NFR BLD AUTO: 87.2 % (ref 43–81)
PLATELET # BLD AUTO: 185 K/UL (ref 150–450)
POTASSIUM SERPL-SCNC: 5.7 MMOL/L (ref 3.5–5.1)
RBC # BLD AUTO: 2.35 MIL/UL (ref 4–5.2)
SODIUM SERPL-SCNC: 139 MMOL/L (ref 136–145)
WBC NRBC COR # BLD AUTO: 8.7 K/UL (ref 4.3–11)

## 2022-05-02 RX ADMIN — SERTRALINE HYDROCHLORIDE SCH MG: 50 TABLET, FILM COATED ORAL at 08:10

## 2022-05-02 RX ADMIN — Medication SCH MG: at 16:18

## 2022-05-02 RX ADMIN — Medication SCH MG: at 08:10

## 2022-05-02 NOTE — NUR
rn notes:

called brother made aware pt is on iv morphine drip, o2 sat 75% on high flow oxygen via non 
rebreather, HR 38, , on comfort measures, pt may  at any time asked to be notified 
when she passes

## 2022-05-02 NOTE — NUR
RN NOTES:

PATIENT O2 SAT 87% ON 5LITER VIA NASAL CANNULA, TITRATEDO2 TO 10 LITER VIA NON 
REBREATHERMASK, DPOA REQUEST NO BIPAP, AT THIS TIME, WILL MONITOR PT, DR MENDENHALL AWARE

## 2022-05-02 NOTE — NUR
rn notes: patient asleep unable to wake up, can not giveany medicine due to risk for 
aspiration, continue monitor

## 2022-05-02 NOTE — NUR
RN OPENING NOTES:

RECEIVED PATIENT IN BED, ASLEEP RESPONSIVE TO TACTILE STIMULI, RESPIRATION NOTED WITH MILD 
SOB, ON NASAL CANNULA AT 5L/MIN, NO S/S OF PAIN OR DISCOMFORT, WITH RIGHT AC iv LINE, WITH 
BILATERAL WRIST RESTRAINT,SKIN CHECKED INTACT, BED KEPT IN LOW AND LOCKED POSITION, CALL 
LIGHT WITHIN REACH, WILL MONITOR THE PATIENT

## 2022-05-02 NOTE — NUR
SW called many [employer] churches for Last Rites as requested by family for patient, No 
available  at this time.



SW called the following churches and more: 





[facility] Address:[address] [place], [address]



[facility] [place] [place], [place] 
[phone]



[facility] [employer] Address:[address] [place] [address] 
[phone]



[facility] [place]Address:[address] [place], [place] 
[phone]



[facility] Address: [address] [place] [address] 
[phone]



[facility] Address:[address] [place], [place] 
[phone]



[facility] Address:[address] [place] [address] [phone]



[facility] Address:[address] [place] [address] [phone]



[facility] Address:[address] [place] [address] 
[phone]

## 2022-05-02 NOTE — NUR
RN NOTES:

patient shows straight line on the monitor , checked pt, can not appreciate vital signs, no 
breathing, DR Kaiser made aware and announced death, post mortum care provided, called 
one legacy made aware, called ELLY Guerra made aware who [facility],M[facility]ine disposed per hospital protocol.body was send to the [facility]